# Patient Record
Sex: MALE | Race: WHITE | URBAN - METROPOLITAN AREA
[De-identification: names, ages, dates, MRNs, and addresses within clinical notes are randomized per-mention and may not be internally consistent; named-entity substitution may affect disease eponyms.]

---

## 2024-03-19 ENCOUNTER — OFFICE VISIT (OUTPATIENT)
Dept: INTERNAL MEDICINE CLINIC | Facility: CLINIC | Age: 39
End: 2024-03-19

## 2024-03-19 VITALS
HEART RATE: 76 BPM | DIASTOLIC BLOOD PRESSURE: 70 MMHG | OXYGEN SATURATION: 98 % | HEIGHT: 69 IN | WEIGHT: 188 LBS | SYSTOLIC BLOOD PRESSURE: 100 MMHG | BODY MASS INDEX: 27.85 KG/M2

## 2024-03-19 DIAGNOSIS — Z00.00 ANNUAL PHYSICAL EXAM: ICD-10-CM

## 2024-03-19 DIAGNOSIS — M54.31 RIGHT SIDED SCIATICA: Primary | ICD-10-CM

## 2024-03-19 PROCEDURE — G2211 COMPLEX E/M VISIT ADD ON: HCPCS | Performed by: INTERNAL MEDICINE

## 2024-03-19 PROCEDURE — 99385 PREV VISIT NEW AGE 18-39: CPT | Performed by: INTERNAL MEDICINE

## 2024-03-19 NOTE — PROGRESS NOTES
Luan Wolff is a 38 year old male  Chief Complaint   Patient presents with    [facility] [place] [facility]     NP Here today to establish care      HPI:   Luan Wolff is a 38 year old male who presents to [facility] care.    Accompanied by his wife.    Previous PCP was Dr. Lees from [facility] in [place], [place], last seen >5 years ago.    Has been living in [place] for 5 years where he met his wife. Moved back to the [place] a few months ago.    C/o occasional R sided sciatica. Denies saddle anesthesia, trauma to his back, midline tenderness, f/c. Asking for PT referral.    Works in [employer] for [employer].    Wt Readings from Last 6 Encounters:   03/19/24 188 lb (85.3 kg)     Body mass index is 27.76 kg/m².     No current outpatient medications on file.      Past Medical History:   Diagnosis Date    Sciatica     R sided      History reviewed. No pertinent surgical history.   Family History   Problem Relation Age of Onset    Heart Attack Father     No Known Problems Sister     No Known Problems Sister     No Known Problems Sister     No Known Problems Sister     No Known Problems Brother     No Known Problems Brother     Other (gangrene) Paternal Grandmother     Alcohol abuse Paternal Grandfather       Social History:  Social History     Socioeconomic History    Marital status:    Tobacco Use    Smoking status: Some Days     Types: Cigarettes    Smokeless tobacco: Never    Tobacco comments:     Occasional cigarette   Substance and Sexual Activity    Alcohol use: Yes     Comment: occasionally 1-2 drinks/week    Drug use: Never           REVIEW OF SYSTEMS:   GENERAL: feels well otherwise  MSK: + r sided sciatica     EXAM:   /70   Pulse 76   Ht 5' 9\" (1.753 m)   Wt 188 lb (85.3 kg)   SpO2 98%   BMI 27.76 kg/m²     GENERAL: well developed, well nourished, in no apparent distress  NEURO: A&O x 3, moves all 4 extremities normally      ASSESSMENT AND PLAN:   Luan Wolff is a 38 year old male who presents to [facility]  care.    Annual physical exam  - advised to obtain labs fasting prior to next visit  - CBC W Differential W Platelet [E]; Future  - Comp Metabolic Panel (14) [E]; Future  - TSH W Reflex To Free T4 [E]; Future  - Lipid Panel [E]; Future    Right sided sciatica  - Physical Therapy Referral - [facility]    Smoker  - counseled regarding cessation    Healthcare Maintenance  Cancer Screenings:  - Colon: at age 47  - Lung: n/a    Vaccines:  - Tdap: 2021, will look into records at [facility]  - Shingles: n/a  - Pneumonia: n/a  - Flu: declines  - COVID-19: x2 in [facility]    Misc:  - PSA screen: n/a  - AAA screen: n/a    RTC in ~1 month for annual physical with labs prior or sooner PRN.    Labs ordered as above. Informed patient to expect messaging only if the labs are abnormal via patient preferred method of communication (phone calls).    For E/M code - 60 minutes spent reviewing performing chart review, obtaining a history, performing a physical exam, reviewing the assessment/plan, placing orders, and completing documentation.     Iesha Quintana DO  3/19/2024  3:17 PM